# Patient Record
Sex: FEMALE | Race: BLACK OR AFRICAN AMERICAN | NOT HISPANIC OR LATINO | Employment: FULL TIME | URBAN - METROPOLITAN AREA
[De-identification: names, ages, dates, MRNs, and addresses within clinical notes are randomized per-mention and may not be internally consistent; named-entity substitution may affect disease eponyms.]

---

## 2017-01-12 ENCOUNTER — TELEPHONE (OUTPATIENT)
Dept: DERMATOLOGY | Facility: CLINIC | Age: 57
End: 2017-01-12

## 2017-01-12 NOTE — TELEPHONE ENCOUNTER
----- Message from Jannette Jack sent at 1/11/2017  2:54 PM CST -----  Stevens, patient [phone], Patient has rash on left leg  Which was previously diagnosed as bacteria. She has schedule an appointment and place on wait list but would like sooner. Please advise. Thanks.

## 2017-01-24 ENCOUNTER — OFFICE VISIT (OUTPATIENT)
Dept: DERMATOLOGY | Facility: CLINIC | Age: 57
End: 2017-01-24
Payer: MEDICARE

## 2017-01-24 VITALS — BODY MASS INDEX: 35.36 KG/M2 | HEIGHT: 66 IN | WEIGHT: 220 LBS

## 2017-01-24 DIAGNOSIS — L01.1 IMPETIGINIZED ATOPIC DERMATITIS: Primary | ICD-10-CM

## 2017-01-24 PROCEDURE — 99201 PR OFFICE/OUTPT VISIT,NEW,LEVL I: CPT | Mod: S$GLB,,, | Performed by: DERMATOLOGY

## 2017-01-24 PROCEDURE — 1159F MED LIST DOCD IN RCRD: CPT | Mod: S$GLB,,, | Performed by: DERMATOLOGY

## 2017-01-24 PROCEDURE — 99999 PR PBB SHADOW E&M-EST. PATIENT-LVL III: CPT | Mod: PBBFAC,,, | Performed by: DERMATOLOGY

## 2017-01-24 RX ORDER — DOXYCYCLINE 100 MG/1
100 CAPSULE ORAL EVERY 12 HOURS
Qty: 14 CAPSULE | Refills: 0 | Status: SHIPPED | OUTPATIENT
Start: 2017-01-24

## 2017-01-24 RX ORDER — TRIAMCINOLONE ACETONIDE 1 MG/G
CREAM TOPICAL
Qty: 60 G | Refills: 3 | Status: SHIPPED | OUTPATIENT
Start: 2017-01-24

## 2017-01-24 NOTE — MR AVS SNAPSHOT
[facility]  [address] [place] [address]  Phone: [phone]                  Heidi Gracia   2017 3:45 PM   Office Visit    Description:  Female : 1960   Provider:  Marley Jaffe MD   Department:  [facility]           Reason for Visit     Rash           Diagnoses this Visit        Comments    Impetiginized atopic dermatitis    -  Primary            To Do List           Goals (5 Years of Data)     None      Follow-Up and Disposition     Return if symptoms worsen or fail to improve.    Follow-up and Disposition History       These Medications        Disp Refills Start End    doxycycline (VIBRAMYCIN) 100 MG Cap 14 capsule 0 2017     Take 1 capsule (100 mg total) by mouth every 12 (twelve) hours. - Oral    Pharmacy: [facility] [address]  [address] [address]ASER [address] AT [facility] Ph #: [phone]       triamcinolone acetonide 0.1% (KENALOG) 0.1 % cream 60 g 3 2017     AAA left shin bid    Pharmacy: [facility] [address] - [place] FRASER - [address] Ph #: [phone]         [facility] On Call     [facility] On Call Nurse Care Line -  Assistance  Registered nurses in the [facility] provide clinical advisement, health education, appointment booking, and other advisory services.  Call for this free service at [phone].             Medications           Message regarding Medications     Verify the changes and/or additions to your medication regime listed below are the same as discussed with your clinician today.  If any of these changes or additions are incorrect, please notify your healthcare provider.        START taking these NEW medications        Refills    doxycycline (VIBRAMYCIN) 100 MG Cap 0    Sig: Take 1 capsule (100 mg total) by mouth every 12 (twelve) hours.    Class: Normal    Route: Oral    triamcinolone acetonide 0.1% (KENALOG) 0.1 %  "cream 3    Sig: AAA left shin bid    Class: Normal           Verify that the below list of medications is an accurate representation of the medications you are currently taking.  If none reported, the list may be blank. If incorrect, please contact your healthcare provider. Carry this list with you in case of emergency.           Current Medications     doxycycline (VIBRAMYCIN) 100 MG Cap Take 1 capsule (100 mg total) by mouth every 12 (twelve) hours.    triamcinolone acetonide 0.1% (KENALOG) 0.1 % cream AAA left shin bid           Clinical Reference Information           Vital Signs - Last Recorded  Most recent update: 1/24/2017  3:54 PM by Kin Iyer MA    Ht Wt BMI          5' 6" (1.676 m) 99.8 kg (220 lb) 35.51 kg/m2        Allergies as of 1/24/2017     Bactrim [Sulfamethoxazole-trimethoprim]      Immunizations Administered on Date of Encounter - 1/24/2017     None      Instructions    XEROSIS (DRY SKIN)    Xerosis is the term for dry skin.  We all have a natural oil coating over our skin produced by the skin oil glands.  If this oil is removed, the skin becomes dry which can lead to cracking, which can lead to inflammation. Xerosis is usually a long-term problem that recurs often, especially in the winter.    1. Cause     Long hot baths or showers can remove our natural oil and lead to xerosis.  One should never take more than one bath or shower a day and for no longer than ten minutes.   Use of harsh soaps such as Zest, Dial, and Ivory can worsen and cause xerosis.   Cold winter weather worsens xerosis because the amount of moisture contained in cold air is much less than the amount of moisture in warm air.    2. Treatment     Treatment is intended to restore the natural oil to your skin.  Keep the skin lubricated.     Do not take more than one bath or shower a day.  Use lukewarm water, not hot.  Hot water dries out the skin.     Use a gentle moisturizing soap such as Cetaphil soap, cerave gentle " cleanser, Oil of Olay, Dove sensitive bar, Basis, Ivory moisture care, Restoraderm cleanser.     When toweling dry, dont rub.  Blot the skin so there is still some water left on the skin.  Immediately after toweling, you should apply a moisturizing cream from neck to toes such as Cerave cream, Cetaphil cream, Restoraderm or Eucerin Original Formula cream.  Alpha hydroxyacid lotions, i.e., AmLactin or Cerave SA, also work very well for preventing dry skin, but may burn when used on inflamed or reddened skin.     If you like to swim during the winter months, you should not use soap when getting out of the pool.  When you have finished swimming, rinse off the chlorine with cool to warm water.  If this will be the only shower of the day, then you may use Cetaphil or another mild soap to cleanse your skin.  After the shower, apply a moisturizing cream to all of the skin as above.    3. Additional recommendation:      Use unscented hypoallergenic detergent for your clothes, avoid softener or dryer sheets unless they are unscented and hypoallergenic (all free and clear; downy free and gentle).    [facility]; [address] [facility] [address] [facility] [place] [address] Tel: [phone] Fax: [phone]         Smoking Cessation     If you would like to quit smoking:   You may be eligible for free services if you are a [place] resident and started smoking cigarettes before September 1, 1988.  Call the [employer]oking [employer]essation [employer] (SCT) toll free at ([phone] 547-0956 or ([phone] 441-7333.   Call 3-800-QUIT-NOW if you do not meet the above criteria.

## 2017-01-24 NOTE — PROGRESS NOTES
Subjective:       Patient ID:  Heidi Gracia is a 56 y.o. female who presents for   Chief Complaint   Patient presents with    Rash     Left lower leg     HPI Comments: Initial visit  Rash started after brace rubbing on area  Various providers rx mupirocin, then clotrimazole, minimal improvement  No moisturizer use      Rash  - Initial  Affected locations: Left lower leg.  Duration: 5 months  Signs / symptoms: itching and scaling  Severity: mild to moderate  Timing: constant  Aggravated by: nothing  Treatments tried: Mupirocin and clotrimazole.  Improvement on treatment: mild        Review of Systems   Skin: Positive for itching and rash.        Objective:    Physical Exam   Constitutional: She appears well-developed and well-nourished. No distress.   Neurological: She is alert and oriented to person, place, and time. She is not disoriented.   Psychiatric: She has a normal mood and affect.   Skin:   Areas Examined (abnormalities noted in diagram):   LLE Inspection Performed              Diagram Legend     Erythematous scaling macule/papule c/w actinic keratosis       Vascular papule c/w angioma      Pigmented verrucoid papule/plaque c/w seborrheic keratosis      Yellow umbilicated papule c/w sebaceous hyperplasia      Irregularly shaped tan macule c/w lentigo     1-2 mm smooth white papules consistent with Milia      Movable subcutaneous cyst with punctum c/w epidermal inclusion cyst      Subcutaneous movable cyst c/w pilar cyst      Firm pink to brown papule c/w dermatofibroma      Pedunculated fleshy papule(s) c/w skin tag(s)      Evenly pigmented macule c/w junctional nevus     Mildly variegated pigmented, slightly irregular-bordered macule c/w mildly atypical nevus      Flesh colored to evenly pigmented papule c/w intradermal nevus       Pink pearly papule/plaque c/w basal cell carcinoma      Erythematous hyperkeratotic cursted plaque c/w SCC      Surgical scar with no sign of skin cancer recurrence       Open and closed comedones      Inflammatory papules and pustules      Verrucoid papule consistent consistent with wart     Erythematous eczematous patches and plaques     Dystrophic onycholytic nail with subungual debris c/w onychomycosis     Umbilicated papule    Erythematous-base heme-crusted tan verrucoid plaque consistent with inflamed seborrheic keratosis     Erythematous Silvery Scaling Plaque c/w Psoriasis     See annotation          Assessment / Plan:        Impetiginized atopic dermatitis  -     doxycycline (VIBRAMYCIN) 100 MG Cap; Take 1 capsule (100 mg total) by mouth every 12 (twelve) hours.  Dispense: 14 capsule; Refill: 0  -     triamcinolone acetonide 0.1% (KENALOG) 0.1 % cream; AAA left shin bid  Dispense: 60 g; Refill: 3      Good skin care regimen discussed including limiting to one bath or shower/day, using lukewarm water with mild soap and moisturizing cream to skin 1 - 2x/day. Brochure was provided and reviewed with patient.  cerave cream            Return if symptoms worsen or fail to improve.

## 2017-01-24 NOTE — PATIENT INSTRUCTIONS
XEROSIS (DRY SKIN)    Xerosis is the term for dry skin.  We all have a natural oil coating over our skin produced by the skin oil glands.  If this oil is removed, the skin becomes dry which can lead to cracking, which can lead to inflammation. Xerosis is usually a long-term problem that recurs often, especially in the winter.    1. Cause     Long hot baths or showers can remove our natural oil and lead to xerosis.  One should never take more than one bath or shower a day and for no longer than ten minutes.   Use of harsh soaps such as Zest, Dial, and Ivory can worsen and cause xerosis.   Cold winter weather worsens xerosis because the amount of moisture contained in cold air is much less than the amount of moisture in warm air.    2. Treatment     Treatment is intended to restore the natural oil to your skin.  Keep the skin lubricated.     Do not take more than one bath or shower a day.  Use lukewarm water, not hot.  Hot water dries out the skin.     Use a gentle moisturizing soap such as Cetaphil soap, cerave gentle cleanser, Oil of Olay, Dove sensitive bar, Basis, Ivory moisture care, Restoraderm cleanser.     When toweling dry, dont rub.  Blot the skin so there is still some water left on the skin.  Immediately after toweling, you should apply a moisturizing cream from neck to toes such as Cerave cream, Cetaphil cream, Restoraderm or Eucerin Original Formula cream.  Alpha hydroxyacid lotions, i.e., AmLactin or Cerave SA, also work very well for preventing dry skin, but may burn when used on inflamed or reddened skin.     If you like to swim during the winter months, you should not use soap when getting out of the pool.  When you have finished swimming, rinse off the chlorine with cool to warm water.  If this will be the only shower of the day, then you may use Cetaphil or another mild soap to cleanse your skin.  After the shower, apply a moisturizing cream to all of the skin as above.    3. Additional  recommendation:      Use unscented hypoallergenic detergent for your clothes, avoid softener or dryer sheets unless they are unscented and hypoallergenic (all free and clear; downy free and gentle).    [facility]; [address] [place] [address] Tel: [phone] Fax: [phone]

## 2017-01-24 NOTE — LETTER
January 24, 2017      Bhakti Covington, NP  [address]  [facility] [address]           [facility]  [address]  [facility] [address]  Phone: [phone]          Patient: Heidi Gracia   MR Number: [id number]   YOB: 1960   Date of Visit: 1/24/2017       Dear Bhakti Covington:    Thank you for referring Heidi Gracia to me for evaluation. Attached you will find relevant portions of my assessment and plan of care.    If you have questions, please do not hesitate to call me. I look forward to following Heidi Gracia along with you.    Sincerely,    Marley Jaffe MD    Enclosure  CC:  No Recipients    If you would like to receive this communication electronically, please contact [email or web address] or [phone] to request more information on [email or web address] Link access.    For providers and/or their staff who would like to refer a patient to [facility], please contact us through our one-stop-shop provider referral line, [facility] , at [phone].    If you feel you have received this communication in error or would no longer like to receive these types of communications, please e-mail [email or web address]

## 2021-04-28 ENCOUNTER — PATIENT MESSAGE (OUTPATIENT)
Dept: RESEARCH | Facility: HOSPITAL | Age: 61
End: 2021-04-28

## 2024-05-23 ENCOUNTER — TELEPHONE (OUTPATIENT)
Dept: TRANSPLANT | Facility: CLINIC | Age: 64
End: 2024-05-23
Payer: MEDICARE

## 2024-05-23 NOTE — TELEPHONE ENCOUNTER
----- Message from Kiritandie Nemesio sent at 5/23/2024  4:55 PM CDT -----    Have medical records that where scanned into media from [facility]. Will call referring MD office if we need any additional information on the pt.    Referring Provider:Rossy Fitzpatrick MD  Phone: [phone]  Fax: [phone]  .  ===========================================================================   Ext referral  Received: Today   Appointment Access  [facility] Referral Pool  Caller: Unspecified (Today,  1:40 PM)  Good afternoon,    Current pt is being referred to hep from Dr Rossy Fitzpatrick for Cirrhosis. I have scanned the referral and records in to media mgr. Please contact pt to schedule and let me know if I can help any further.    Thank you,  Stacy Coleman  [facility]

## 2024-05-24 ENCOUNTER — TELEPHONE (OUTPATIENT)
Dept: TRANSPLANT | Facility: CLINIC | Age: 64
End: 2024-05-24
Payer: MEDICARE

## 2024-05-24 NOTE — TELEPHONE ENCOUNTER
Pt records reviewed.  Pt will be referred to Hepatology due to cirrhosis  Initial referral received  from Dr. Rossy Fitzpatrick  Referral letter sent to patient.      RECORDS SCANNED IN MEDIA UNDER HEPATOLOGY REFERRAL .

## 2024-05-24 NOTE — TELEPHONE ENCOUNTER
Called the patient to schedule a hepatology consult from referral.  No answer, left voicemail with call back # [phone].

## 2024-05-24 NOTE — LETTER
May 24, 2024               Dear Dr. Rossy Fitzpatrick    Patient: Heidi Gracia   MR Number: [id number]   YOB: 1960     Thank you for the referral of Heidi Gracia to the [facility] program. An initial appointment will  be scheduled for your patient with one of our hepatologists.      Thank you again for your trust in our program.  If there is anything we can do for you or your staff, please feel free to contact us.        Sincerely,        [facility]   [address] [place] [address]  [phone]

## 2024-05-24 NOTE — LETTER
May 24, 2024    Heidi Gracia  [address] [facility] [address]      Dear Heidi Gracia:    Your doctor has referred you to the [facility]. We are sending this letter to remind you to make an appointment with us to complete the referral process.     Please call us at [phone] to schedule an appointment. We look forward to seeing you soon.     If you received a call and have been scheduled, please disregard this letter.       Sincerely,        [facility]   [address] [place] [address]  [phone]

## 2024-05-28 NOTE — TELEPHONE ENCOUNTER
Called the patient to schedule a hepatology consult from referral.  No answer, left voicemail with call back # [phone].

## 2024-05-29 NOTE — TELEPHONE ENCOUNTER
Called the patient to schedule a hepatology consult from referral.  No answer, left voicemail with call back # [phone]. Letter for unable to contact has been mailed.

## 2024-06-10 ENCOUNTER — TELEPHONE (OUTPATIENT)
Dept: HEPATOLOGY | Facility: CLINIC | Age: 64
End: 2024-06-10
Payer: MEDICARE

## 2024-06-10 NOTE — TELEPHONE ENCOUNTER
Called the patient to schedule a hepatology consult from referral.  No answer, left voicemail with call back # [phone].

## 2024-06-10 NOTE — TELEPHONE ENCOUNTER
----- Message from Yennifer Vogel sent at 6/10/2024 12:41 PM CDT -----  Regarding: Scheduling Request  Contact: Heidi Gracia  Scheduling Request           Appt Type:  NP_     Date/Time Preference:any     Treating Provider:     Caller Name:Heidi Gracia      Contact Preference:[phone] (home)       Comments/notes:Patient is calling to schedule from referral. Requesting a call back